# Patient Record
Sex: MALE | Race: WHITE | NOT HISPANIC OR LATINO | Employment: OTHER | URBAN - METROPOLITAN AREA
[De-identification: names, ages, dates, MRNs, and addresses within clinical notes are randomized per-mention and may not be internally consistent; named-entity substitution may affect disease eponyms.]

---

## 2018-04-23 PROBLEM — M50.30 DDD (DEGENERATIVE DISC DISEASE), CERVICAL: Status: ACTIVE | Noted: 2018-04-23

## 2018-04-23 PROBLEM — M54.81 OCCIPITAL NEURITIS: Status: ACTIVE | Noted: 2018-04-23

## 2018-04-24 ENCOUNTER — APPOINTMENT (OUTPATIENT)
Dept: NEUROLOGY | Facility: MEDICAL CENTER | Age: 58
End: 2018-04-24
Payer: COMMERCIAL

## 2020-03-18 ENCOUNTER — NON-PROVIDER VISIT (OUTPATIENT)
Dept: NEUROLOGY | Facility: MEDICAL CENTER | Age: 60
End: 2020-03-18
Payer: COMMERCIAL

## 2020-03-18 DIAGNOSIS — G25.3 MYOCLONUS: ICD-10-CM

## 2020-03-18 DIAGNOSIS — F07.81 POSTCONCUSSION SYNDROME: ICD-10-CM

## 2020-03-18 PROCEDURE — 95700 EEG CONT REC W/VID EEG TECH: CPT | Performed by: PSYCHIATRY & NEUROLOGY

## 2020-03-18 PROCEDURE — 95719 EEG PHYS/QHP EA INCR W/O VID: CPT | Performed by: PSYCHIATRY & NEUROLOGY

## 2020-03-18 PROCEDURE — 95708 EEG WO VID EA 12-26HR UNMNTR: CPT | Performed by: PSYCHIATRY & NEUROLOGY

## 2020-03-18 NOTE — PROCEDURES
24 HR AMBULATORY ELECTROENCEPHALOGRAM REPORT      Referring provider: Dr. Lupillo Doshi D.C.     DOS:   3/18/2020 (recorded for 23 hours and 15 minutes).     INDICATION:  Gatito Patel 60 y.o. male presenting with post concussion syndrome, myoclonus, leg jerks, generalized body convulsions.     CURRENT ANTIEPILEPTIC REGIMEN: None.      TECHNIQUE: A 30-channel, 24 hrs ambulatory electroencephalogram (aEEG) was performed in accordance with the international 10-20 system. This digital study was reviewed in bipolar and referential montages. The recording examined the patient during wakeful, drowsy and sleep states.     DESCRIPTION OF THE RECORD:  During the awake state, background shows symmetrical 10 Hz alpha activity posteriorly with amplitude of 70 mV.  There was reactivity with eye opening/closure.  Normal anterior-posterior gradient was noted with faster beta frequencies seen anteriorly.  During drowsiness, high-amplitude delta frequencies were seen.    During the sleep state, background shows diffuse high-amplitude 4-5 Hz delta activity.  Symmetrical high-amplitude sleep spindles and vertex sharp activities were seen in the central leads.    ACTIVATION PROCEDURES:   Hyperventilation was performed by the patient for a total of 3 minutes. The technician performing the test noted good effort. This technique produced electroencephalographic changes in keeping with the expected bilaterally synchronous, frontally predominant, high amplitude slow waves build up.     Intermittent Photic stimulation was performed in a stepwise fashion from 1 to 30 Hz and elicited a normal response (photic driving), most noticeable in the posterior leads.      ICTAL AND/OR INTERICTAL FINDINGS:   No focal or generalized epileptiform activity was noted. No regional slowing was seen during this study.  No seizures were recorded during the study.     EVENT(S):  No clinical events reported.     EKG: sampling review of EKG recording  demonstrated sinus rhythm.       INTERPRETATION:   This is a normal 24 hours ambulatory electroencephalogram recording in the awake, drowsy and sleep state.  No typical events or seizures were captured during the study. Clinical correlation is recommended.    Note: A normal electroencephalogram does not rule out epilepsy. Consider prolonged recording to capture spells in the epilepsy monitoring unit if clinically warranted.       Carlos Barraza MD   Epilepsy and Neurodiagnostics.   Clinical  of Neurology [facility].   Diplomate in Neurology, Epilepsy, and Electrodiagnostic Medicine.   Office: [phone]  Fax: [phone]

## 2020-03-19 ENCOUNTER — NON-PROVIDER VISIT (OUTPATIENT)
Dept: NEUROLOGY | Facility: MEDICAL CENTER | Age: 60
End: 2020-03-19
Payer: COMMERCIAL